# Patient Record
(demographics unavailable — no encounter records)

---

## 2025-04-03 NOTE — HISTORY OF PRESENT ILLNESS
[FreeTextEntry1] : 91 year old woman a history PAF s/p PPM, CVA, DM,  HFPEF, hypothyroidism presents for  followup evaluation.   Since her last visit, she recently lost her son. She has been more depressed. She is living with an aide.  She has been sedentary. She had a mechanical fall in the bathroom. She still has GRIDER with minimal walking but unchanged.  She still has palpitations which are unchanged.. She   denies any chest pain, PND, orthopnea,   near syncope, syncope,  stroke like symptoms. She intermittently has ankle edema but now improved with lasix 60mg Qday.  Medication reconciliation performed. Medication reconciliation performed.  She had an eye hemorrhage when on Eliquis so went on coumadin.

## 2025-04-03 NOTE — CARDIOLOGY SUMMARY
[de-identified] : atrial fibrillation  -Incomplete right bundle branch block and left axis -anterior fascicular block. -ST depression -Nondiagnostic -possible digitalis effect, -consider subendocardial injury/ischemia. [de-identified] : 8/2023 pharm: neg spect  [de-identified] : 5/14/20 EF 55% mild dilation of Ao. no sig valuvalr disease.  8/10/23 grade 2 diastolic dysfunction EF 55%, mild to mod AS. PASP 40

## 2025-04-03 NOTE — PHYSICAL EXAM
[Normal Rate] : normal [Rhythm Regular] : regular [Normal S1] : normal S1 [Normal S2] : normal S2 [II] : a grade 2 [I] : a grade 1 [___ +] : bilateral [unfilled]U+ pretibial pitting edema [Rt] : varicose veins of the right leg noted [Lt] : varicose veins of the left leg noted [1+] : left 1+ [Right Carotid Bruit] : no bruit heard over the right carotid [Left Carotid Bruit] : no bruit heard over the left carotid [Normal] : soft, non-tender, no masses/organomegaly, normal bowel sounds [de-identified] : elevated venous prssure

## 2025-04-03 NOTE — DISCUSSION/SUMMARY
[FreeTextEntry1] : 90 year woman with a history as listed presents for followup cardiac evaluation.  Krystal is complaining of  GRIDER that is unchanged. She is euvolemic on exam. She will continue Lasix 60mg Qday.  She is  in atrial fibrillation which is relatively controlled. This may be causing her dyspnea. If she returns to [facility] I would consider outpatient Amio. A FAVIOLA/DCCV is a bit more aggressive than she would like.   She will continue Lopressor 50mg q12. She did not tolerate Digoxin 125mg QOD 2/2 dizziness.  She was maintained on Coumadin therapy given her hemorrhage on DOAC in the past.  Goal INR 2-3 which is managed by her pulmonologist. Exercise and diet counseling was performed in order to reduce her future cardiovascular risk.  She will follow-up with me in 6 months. [EKG obtained to assist in diagnosis and management of assessed problem(s)] : EKG obtained to assist in diagnosis and management of assessed problem(s)